# Patient Record
Sex: FEMALE | Race: WHITE | URBAN - NONMETROPOLITAN AREA
[De-identification: names, ages, dates, MRNs, and addresses within clinical notes are randomized per-mention and may not be internally consistent; named-entity substitution may affect disease eponyms.]

---

## 2017-04-25 ENCOUNTER — TELEPHONE (OUTPATIENT)
Dept: FAMILY MEDICINE | Facility: OTHER | Age: 42
End: 2017-04-25

## 2017-04-25 NOTE — TELEPHONE ENCOUNTER
Panel Management Review      Patient has the following on her problem list:       Composite cancer screening  Chart review shows that this patient is due/due soon for the following   Summary:    Patient is due/failing the following:   ACT, PAP and PHQ9    Action needed:   Patient needs office visit for physical.    Type of outreach:    Sent letter.  Phone number is incorrect.    Questions for provider review:    None                                                                                                                                    Ximena EID LPN       Chart routed to Ximena EID LPN   .

## 2017-04-25 NOTE — LETTER
[facility]  [address] [place] [address]  [phone]        April 25, 2017    Anais Miranda  [address]  [place] [address]          Dear Anais,    In coordination of your health care, we see you are overdue for asthma questions, depression questions and pap smear. Please schedule an appointment with your primary provider.        If you have any questions or problems, please give us a call at the clinic.       Sincerely,        Spencer Canchola PA-C/michaela,TOMEKAN

## 2017-07-11 ENCOUNTER — TELEPHONE (OUTPATIENT)
Dept: FAMILY MEDICINE | Facility: OTHER | Age: 42
End: 2017-07-11

## 2017-07-11 NOTE — TELEPHONE ENCOUNTER
Pt is past due for f/u pap smear.  Reminder letter was sent 12/23/16 and 04/25/17.  LMTC and schedule at [facility].  Left this writer's number in case of questions ([phone]).  If no reply and/or appt within 2 weeks (07/25/17) pt will be considered lost to pap tracking f/u.  Clarisa Patel,    Pap Tracking

## 2017-08-26 ENCOUNTER — HEALTH MAINTENANCE LETTER (OUTPATIENT)
Age: 42
End: 2017-08-26
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
## 2017-11-13 ENCOUNTER — OFFICE VISIT (OUTPATIENT)
Dept: FAMILY MEDICINE | Facility: OTHER | Age: 42
End: 2017-11-13
Payer: MEDICAID

## 2017-11-13 ENCOUNTER — RESULT FOLLOW UP (OUTPATIENT)
Dept: FAMILY MEDICINE | Facility: OTHER | Age: 42
End: 2017-11-13

## 2017-11-13 VITALS
DIASTOLIC BLOOD PRESSURE: 78 MMHG | TEMPERATURE: 98.4 F | BODY MASS INDEX: 22.36 KG/M2 | RESPIRATION RATE: 16 BRPM | SYSTOLIC BLOOD PRESSURE: 112 MMHG | WEIGHT: 131 LBS | HEIGHT: 64 IN | HEART RATE: 104 BPM

## 2017-11-13 DIAGNOSIS — F41.9 ANXIETY: ICD-10-CM

## 2017-11-13 DIAGNOSIS — F40.10 SOCIAL PHOBIA: ICD-10-CM

## 2017-11-13 DIAGNOSIS — Z72.0 TOBACCO ABUSE: ICD-10-CM

## 2017-11-13 DIAGNOSIS — F33.0 MAJOR DEPRESSIVE DISORDER, RECURRENT EPISODE, MILD (H): ICD-10-CM

## 2017-11-13 DIAGNOSIS — D06.9 CARCINOMA IN SITU OF CERVIX, UNSPECIFIED LOCATION: ICD-10-CM

## 2017-11-13 DIAGNOSIS — F33.1 MAJOR DEPRESSIVE DISORDER, RECURRENT EPISODE, MODERATE (H): ICD-10-CM

## 2017-11-13 DIAGNOSIS — F10.10 ETOH ABUSE: ICD-10-CM

## 2017-11-13 DIAGNOSIS — F43.10 PTSD (POST-TRAUMATIC STRESS DISORDER): ICD-10-CM

## 2017-11-13 DIAGNOSIS — Z72.0 TOBACCO ABUSE: Primary | ICD-10-CM

## 2017-11-13 DIAGNOSIS — N76.0 BACTERIAL VAGINAL INFECTION: ICD-10-CM

## 2017-11-13 DIAGNOSIS — D06.7 CARCINOMA IN SITU OF OTHER PART OF CERVIX: ICD-10-CM

## 2017-11-13 DIAGNOSIS — F31.10 BIPOLAR DISORDER, MOST RECENT EPISODE MANIC (H): ICD-10-CM

## 2017-11-13 DIAGNOSIS — B96.89 BACTERIAL VAGINAL INFECTION: ICD-10-CM

## 2017-11-13 DIAGNOSIS — F31.73 BIPOLAR DISORDER, IN PARTIAL REMISSION, MOST RECENT EPISODE MANIC (H): ICD-10-CM

## 2017-11-13 DIAGNOSIS — J45.30 MILD PERSISTENT ASTHMA WITHOUT COMPLICATION: ICD-10-CM

## 2017-11-13 DIAGNOSIS — Z90.710 S/P HYSTERECTOMY: ICD-10-CM

## 2017-11-13 DIAGNOSIS — Z72.51 HIGH RISK SEXUAL BEHAVIOR: ICD-10-CM

## 2017-11-13 DIAGNOSIS — F41.0 PANIC ATTACK: ICD-10-CM

## 2017-11-13 LAB
SPECIMEN SOURCE: ABNORMAL
WET PREP SPEC: ABNORMAL

## 2017-11-13 PROCEDURE — 88142 CYTOPATH C/V THIN LAYER: CPT | Performed by: PHYSICIAN ASSISTANT

## 2017-11-13 PROCEDURE — 87624 HPV HI-RISK TYP POOLED RSLT: CPT | Performed by: PHYSICIAN ASSISTANT

## 2017-11-13 PROCEDURE — 99214 OFFICE O/P EST MOD 30 MIN: CPT | Performed by: PHYSICIAN ASSISTANT

## 2017-11-13 PROCEDURE — 87210 SMEAR WET MOUNT SALINE/INK: CPT | Performed by: PHYSICIAN ASSISTANT

## 2017-11-13 RX ORDER — QUETIAPINE FUMARATE 100 MG/1
200 TABLET, FILM COATED ORAL 2 TIMES DAILY
Qty: 100 TABLET | Refills: 1 | Status: SHIPPED | OUTPATIENT
Start: 2017-11-13 | End: 2018-03-06

## 2017-11-13 RX ORDER — QUETIAPINE FUMARATE 50 MG/1
50 TABLET, FILM COATED ORAL 2 TIMES DAILY
Qty: 60 TABLET | Refills: 1 | Status: SHIPPED | OUTPATIENT
Start: 2017-11-13 | End: 2018-03-06

## 2017-11-13 RX ORDER — METRONIDAZOLE 500 MG/1
500 TABLET ORAL 2 TIMES DAILY
Qty: 14 TABLET | Refills: 0 | Status: SHIPPED | OUTPATIENT
Start: 2017-11-13 | End: 2018-03-06

## 2017-11-13 ASSESSMENT — ANXIETY QUESTIONNAIRES
1. FEELING NERVOUS, ANXIOUS, OR ON EDGE: MORE THAN HALF THE DAYS
GAD7 TOTAL SCORE: 11
6. BECOMING EASILY ANNOYED OR IRRITABLE: SEVERAL DAYS
5. BEING SO RESTLESS THAT IT IS HARD TO SIT STILL: MORE THAN HALF THE DAYS
GAD7 TOTAL SCORE: 11
7. FEELING AFRAID AS IF SOMETHING AWFUL MIGHT HAPPEN: SEVERAL DAYS
GAD7 TOTAL SCORE: 11
2. NOT BEING ABLE TO STOP OR CONTROL WORRYING: MORE THAN HALF THE DAYS
7. FEELING AFRAID AS IF SOMETHING AWFUL MIGHT HAPPEN: SEVERAL DAYS
4. TROUBLE RELAXING: SEVERAL DAYS
3. WORRYING TOO MUCH ABOUT DIFFERENT THINGS: MORE THAN HALF THE DAYS

## 2017-11-13 ASSESSMENT — PAIN SCALES - GENERAL: PAINLEVEL: NO PAIN (0)

## 2017-11-13 ASSESSMENT — PATIENT HEALTH QUESTIONNAIRE - PHQ9
10. IF YOU CHECKED OFF ANY PROBLEMS, HOW DIFFICULT HAVE THESE PROBLEMS MADE IT FOR YOU TO DO YOUR WORK, TAKE CARE OF THINGS AT HOME, OR GET ALONG WITH OTHER PEOPLE: VERY DIFFICULT
SUM OF ALL RESPONSES TO PHQ QUESTIONS 1-9: 11
SUM OF ALL RESPONSES TO PHQ QUESTIONS 1-9: 11

## 2017-11-13 NOTE — MR AVS SNAPSHOT
After Visit Summary   11/13/2017    Anais Miranda    MRN: [id number]           Patient Information     Date Of Birth          1975        Visit Information        Provider Department      11/13/2017 11:00 AM Spencer Peter PA-C [facility]        Today's Diagnoses     Tobacco abuse    -  1    Social phobia        PTSD (post-traumatic stress disorder)        Major depressive disorder, recurrent episode, moderate (H)        High risk sexual behavior        Bipolar disorder, most recent episode manic (H)        ETOH abuse        Panic attack        Bipolar disorder, in partial remission, most recent episode manic (H)        Anxiety        Major depressive disorder, recurrent episode, mild (H)        PTSD (post-traumatic stress disorder)        Major depressive disorder, recurrent episode, moderate        Mild persistent asthma without complication        S/P hysterectomy        Carcinoma in situ of other part of cervix           Follow-ups after your visit        Additional Services     ASTHMA EDUCATION REFERRAL         Please be aware that coverage of these services is subject to the terms and limitations of your health insurance plan.  Call member services at your health plan with any benefit or coverage questions.      Please bring the following to your appointment:     >>   List of current medications  >>   This referral request   >>   Any documents/labs given to you for this referral  Your spacer device and/or peak meter if you have one                  Future tests that were ordered for you today     Open Future Orders        Priority Expected Expires Ordered    ASTHMA EDUCATION REFERRAL Routine  5/13/2018 11/13/2017            Who to contact     If you have questions or need follow up information about today's clinic visit or your schedule please contact [facility] directly at [phone].  Normal or non-critical lab and imaging results will be communicated  "to you by Turbinehart, letter or phone within 4 business days after the clinic has received the results. If you do not hear from us within 7 days, please contact the clinic through [employer] or phone. If you have a critical or abnormal lab result, we will notify you by phone as soon as possible.  Submit refill requests through [employer] or call your pharmacy and they will forward the refill request to us. Please allow 3 business days for your refill to be completed.          Additional Information About Your Visit        [employer] Information     [employer]One lets you send messages to your doctor, view your test results, renew your prescriptions, schedule appointments and more. To sign up, go to [email or web address] . Click on \"Log in\" on the left side of the screen, which will take you to the Welcome page. Then click on \"Sign up Now\" on the right side of the page.     You will be asked to enter the access code listed below, as well as some personal information. Please follow the directions to create your username and password.     Your access code is: [email or web address]  Expires: 2018 11:27 AM     Your access code will  in 90 days. If you need help or a new code, please call your [facility] clinic or [phone].        Care EveryWhere ID     This is your Care EveryWhere ID. This could be used by other organizations to access your [facility] medical records  [id number]        Your Vitals Were     Pulse Temperature Respirations Height Last Period BMI (Body Mass Index)    104 98.4  F (36.9  C) (Temporal) 16 5' 3.89\" (1.623 m) 2008 22.56 kg/m2       Blood Pressure from Last 3 Encounters:   17 112/78   11/10/16 114/60   16 100/62    Weight from Last 3 Encounters:   17 131 lb (59.4 kg)   11/10/16 140 lb 1.6 oz (63.5 kg)   16 127 lb 9.6 oz (57.9 kg)              We Performed the Following     Pap imaged thin layer screen with HPV - recommended age 30 - 65 years (select HPV order below)     Wet prep  "         Where to get your medicines      Some of these will need a paper prescription and others can be bought over the counter.  Ask your nurse if you have questions.     Bring a paper prescription for each of these medications     QUEtiapine 100 MG tablet    QUEtiapine 50 MG tablet          Primary Care Provider Office Phone # Fax #    Spencer Peter PA-C [phone]       [address] [facility] [address]        Equal Access to Services     DREW MARTIN : Hadii aad ku hadasho Soomaali, waaxda luqadaha, qaybta kaalmada adeegyada, waxay idiin hayaan adeeg ashleystevenken alexandre . So [facility] [phone].    ATENCIÓN: Si habla espirina, tiene a swan disposición servicios gratuitos de asistencia lingüística. Llame al [phone].    We comply with applicable federal civil rights laws and [place] laws. We do not discriminate on the basis of race, color, national origin, age, disability, sex, sexual orientation, or gender identity.            Thank you!     Thank you for choosing [facility]  for your care. Our goal is always to provide you with excellent care. Hearing back from our patients is one way we can continue to improve our services. Please take a few minutes to complete the written survey that you may receive in the mail after your visit with us. Thank you!             Your Updated Medication List - Protect others around you: Learn how to safely use, store and throw away your medicines at www.disposemymeds.org.          This list is accurate as of: 11/13/17 11:27 AM.  Always use your most recent med list.                   Brand Name Dispense Instructions for use Diagnosis    albuterol 108 (90 BASE) MCG/ACT Inhaler    PROAIR HFA/PROVENTIL HFA/VENTOLIN HFA    3 Inhaler    Inhale 2 puffs into the lungs every 6 hours as needed for shortness of breath / dyspnea    Mild persistent asthma without complication, Bipolar affective disorder, remission status unspecified (H), Social phobia, Panic attack,  PTSD (post-traumatic stress disorder)       LORazepam 1 MG tablet    ATIVAN    20 tablet    Take 1 tablet (1 mg) by mouth every 8 hours as needed for anxiety    Social phobia, Panic attack, Major depressive disorder, recurrent episode, mild (H), PTSD (post-traumatic stress disorder), Bipolar disorder, in partial remission, most recent episode manic (H), Major depressive disorder, recurrent episode, moderate (H), Tobacco abuse, ETOH abuse, Anxiety       * QUEtiapine 50 MG tablet    SEROQUEL    60 tablet    Take 1 tablet (50 mg) by mouth 2 times daily    Major depressive disorder, recurrent episode, moderate (H), Social phobia, Panic attack, PTSD (post-traumatic stress disorder), Bipolar disorder, in partial remission, most recent episode manic (H), Anxiety, Major depressive disorder, recurrent episode, mild (H), Tobacco abuse, ETOH abuse       * QUEtiapine 100 MG tablet    SEROquel    100 tablet    Take 2 tablets (200 mg) by mouth 2 times daily For one week then 150 PO BID.    Social phobia, Panic attack, Major depressive disorder, recurrent episode, mild (H), Bipolar disorder, in partial remission, most recent episode manic (H), Major depressive disorder, recurrent episode, moderate (H), Tobacco abuse, ETOH abuse, Anxiety, PTSD (post-traumatic stress disorder)       * Notice:  This list has 2 medication(s) that are the same as other medications prescribed for you. Read the directions carefully, and ask your doctor or other care provider to review them with you.

## 2017-11-13 NOTE — LETTER
January 23, 2018      Anais Miranda  [address]   [facility] [address]    Dear ,      At [facility], your health and wellness is our primary concern. That is why we are following up on a positive high risk HPV test from 11/13/17, which was reported as HPV 16. Your provider had recommended that you have a Colposcopy completed by 2/13/18. Our records do not show that this has been scheduled.    It is important to complete the follow up that your provider has suggested for you to ensure that there are no worsening changes which may, over time, develop into cancer.      Please contact our office at  [phone] to schedule an appointment for a Colposcopy at your earliest convenience. If you have questions or concerns, please call the clinic and we will be happy to assist you.    If you have completed the tests outside of [facility], please have the results forwarded to our office. We will update the chart for your primary Physician to review before your next annual physical.     Thank you for choosing [facility]!    Sincerely,      Spencer Longo PA-C/odin

## 2017-11-13 NOTE — NURSING NOTE
"Chief Complaint   Patient presents with     Physical     Depression     Panel Management     mychart, flu, tdap, pap, phq9, AAP, ACT       Initial /78 (Cuff Size: Adult Regular)  Pulse 104  Temp 98.4  F (36.9  C) (Temporal)  Resp 16  Ht 5' 3.89\" (1.623 m)  Wt 131 lb (59.4 kg)  LMP 01/04/2008  BMI 22.56 kg/m2 Estimated body mass index is 22.56 kg/(m^2) as calculated from the following:    Height as of this encounter: 5' 3.89\" (1.623 m).    Weight as of this encounter: 131 lb (59.4 kg).  Medication Reconciliation: complete  "

## 2017-11-13 NOTE — LETTER
January 15, 2018      Anais Miranda  [address]   [facility] [address]    Dear ,      We are contacting you in writing because we have been unable to reach you by phone.    This letter is in regards to the pap smear and HPV (Human Papillomavirus) test you had done recently. Your PAP test result is normal, but your HPV (Human Papillomavirus) test was positive for a high risk type of the HPV. HPV is a common virus found in sexually active men and women. Some high risk strains of the HPV virus can be risk factors for later development of cancer.    About 80 percent of women have been exposed to HPV virus throughout their lifetime. There is no medication for the treatment of HPV. Typically your own immune system gets rid of the virus before it does harm. HPV is spread by direct skin-to-skin contact, including sexual intercourse, oral sex, anal sex, or any other contact involving the genital area (example: hand to genital contact). It is not possible to become infected with HPV by touching an object, such as a toilet seat. Most people who are infected with HPV have no signs or symptoms.    Your doctor has recommended that you have specific test called colposcopy. This test allows the provider to closely examine your cervix. If biopsies are taken, the results will be complete within two weeks and will be used to determine the plan for future follow-up.      Please call the [facility] at [phone] to schedule this procedure. Schedule this for a time when you are not due to have a period (if having regular menstrual bleeding). You can take an over the counter pain reliever 1 hour before your colposcopy. Nothing in the vagina for 24 hours before your colposcopy (no sex, douches, vaginal medications or lubricants).     If you have questions regarding this result please contact [phone].    Sincerely,      Spencer Longo PA-C/daxa

## 2017-11-13 NOTE — LETTER
My Asthma Action Plan  Name: Anais Miranda   YOB: 1975  Date: 11/20/2017   My doctor: Spencer Longo PA-C   My clinic: [facility]        My Control Medicine:   My Rescue Medicine: Albuterol (Proair/Ventolin/Proventil) inhaler as directed   My Asthma Severity: intermittent  Avoid your asthma triggers: upper respiratory infections, mold and cold air               GREEN ZONE   Good Control    I feel good    No cough or wheeze    Can work, sleep and play without asthma symptoms       Take your asthma control medicine every day.     1. If exercise triggers your asthma, take your rescue medication    15 minutes before exercise or sports, and    During exercise if you have asthma symptoms  2. Spacer to use with inhaler: If you have a spacer, make sure to use it with your inhaler             YELLOW ZONE Getting Worse  I have ANY of these:    I do not feel good    Cough or wheeze    Chest feels tight    Wake up at night   1. Keep taking your Green Zone medications  2. Start taking your rescue medicine:    every 20 minutes for up to 1 hour. Then every 4 hours for 24-48 hours.  3. If you stay in the Yellow Zone for more than 12-24 hours, contact your doctor.  4. If you do not return to the Green Zone in 12-24 hours or you get worse, start taking your oral steroid medicine if prescribed by your provider.           RED ZONE Medical Alert - Get Help  I have ANY of these:    I feel awful    Medicine is not helping    Breathing getting harder    Trouble walking or talking    Nose opens wide to breathe       1. Take your rescue medicine NOW  2. If your provider has prescribed an oral steroid medicine, start taking it NOW  3. Call your doctor NOW  4. If you are still in the Red Zone after 20 minutes and you have not reached your doctor:    Take your rescue medicine again and    Call 911 or go to the emergency room right away    See your regular doctor within 2 weeks of an Emergency Room or Urgent  Care visit for follow-up treatment.        Electronically signed by: Spencer Longo, November 20, 2017    Annual Reminders:  Meet with Asthma Educator,  Flu Shot in the Fall, consider Pneumonia Vaccination for patients with asthma (aged 19 and older).    Pharmacy:    [facility], [place] - [address] [id number] - [place], [place] - [address] [facility] AT [address] [place] [address] & [facility] - [place], [place] - [address] [facility] [address] - [place], [place] - [address] AT [place] [address] [address]                    Asthma Triggers  How To Control Things That Make Your Asthma Worse    Triggers are things that make your asthma worse.  Look at the list below to help you find your triggers and what you can do about them.  You can help prevent asthma flare-ups by staying away from your triggers.      Trigger                                                          What you can do   Cigarette Smoke  Tobacco smoke can make asthma worse. Do not allow smoking in your home, car or around you.  Be sure no one smokes at a child s day care or school.  If you smoke, ask your health care provider for ways to help you quit.  Ask family members to quit too.  Ask your health care provider for a referral to Quit Plan to help you quit smoking, or call 8-450-498-PLAN.     Colds, Flu, Bronchitis  These are common triggers of asthma. Wash your hands often.  Don t touch your eyes, nose or mouth.  Get a flu shot every year.     Dust Mites  These are tiny bugs that live in cloth or carpet. They are too small to see. Wash sheets and blankets in hot water every week.   Encase pillows and mattress in dust mite proof covers.  Avoid having carpet if you can. If you have carpet, vacuum weekly.   Use a dust mask and HEPA vacuum.   Pollen and Outdoor Mold  Some people are allergic to trees, grass, or weed pollen, or molds. Try to keep your windows  closed.  Limit time out doors when pollen count is high.   Ask you health care provider about taking medicine during allergy season.     Animal Dander  Some people are allergic to skin flakes, urine or saliva from pets with fur or feathers. Keep pets with fur or feathers out of your home.    If you can t keep the pet outdoors, then keep the pet out of your bedroom.  Keep the bedroom door closed.  Keep pets off cloth furniture and away from stuffed toys.     Mice, Rats, and Cockroaches  Some people are allergic to the waste from these pests.   Cover food and garbage.  Clean up spills and food crumbs.  Store grease in the refrigerator.   Keep food out of the bedroom.   Indoor Mold  This can be a trigger if your home has high moisture. Fix leaking faucets, pipes, or other sources of water.   Clean moldy surfaces.  Dehumidify basement if it is damp and smelly.   Smoke, Strong Odors, and Sprays  These can reduce air quality. Stay away from strong odors and sprays, such as perfume, powder, hair spray, paints, smoke incense, paint, cleaning products, candles and new carpet.   Exercise or Sports  Some people with asthma have this trigger. Be active!  Ask your doctor about taking medicine before sports or exercise to prevent symptoms.    Warm up for 5-10 minutes before and after sports or exercise.     Other Triggers of Asthma  Cold air:  Cover your nose and mouth with a scarf.  Sometimes laughing or crying can be a trigger.  Some medicines and food can trigger asthma.

## 2017-11-13 NOTE — LETTER
[facility]  [address] [facility] [address]  Phone: [phone]    November 13, 2017        Anais Miranda  [address]   [facility] [address]          To whom it may concern:    RE: Anais Miranda    Patient was seen and treated today at our clinic and missed work.  She is struggling with her PTSD and ETOH abuse again.  Please excuse her from work for 2 weeks and it is advised that she consider disability testing for mental health reasons.    Please contact me for questions or concerns.      Sincerely,        Spencer Longo PA-C

## 2017-11-13 NOTE — LETTER
October 29, 2018      Anais Miranda  [address]   [facility] [address]    Dear ,      At [facility], your health and wellness is our primary concern. That is why we are following up on a colposcopy from 4/12/18. Your provider had recommended that you have a Pap smear and HPV test completed by 11/13/18. Our records do not show that this has been scheduled.    It is important to complete the follow up that your provider has suggested for you to ensure that there are no worsening changes which may, over time, develop into cancer.      Please contact our office at  [phone] to schedule an appointment for a Pap smear and HPV test at your earliest convenience. If you have questions or concerns, please call the clinic and we will be happy to assist you.    If you have completed the tests outside of [facility], please have the results forwarded to our office. We will update the chart for your primary Physician to review before your next annual physical.     Thank you for choosing [facility]!    Sincerely,      Spencer Longo PA-C/odin

## 2017-11-14 RX ORDER — QUETIAPINE FUMARATE 50 MG/1
TABLET, FILM COATED ORAL
Qty: 60 TABLET | Refills: 0 | OUTPATIENT
Start: 2017-11-14

## 2017-11-14 ASSESSMENT — ANXIETY QUESTIONNAIRES: GAD7 TOTAL SCORE: 11

## 2017-11-14 ASSESSMENT — ASTHMA QUESTIONNAIRES: ACT_TOTALSCORE: 15

## 2017-11-15 ENCOUNTER — TELEPHONE (OUTPATIENT)
Dept: FAMILY MEDICINE | Facility: OTHER | Age: 42
End: 2017-11-15

## 2017-11-15 LAB
COPATH REPORT: NORMAL
PAP: NORMAL

## 2017-11-15 NOTE — TELEPHONE ENCOUNTER
Reason for Call:  Form, our goal is to have forms completed with 72 hours, however, some forms may require a visit or additional information.    Type of letter, form or note:  medical    Who is the form from?: [facility]  (if other please explain)    Where did the form come from: form was faxed in    What clinic location was the form placed at?: [facility] - [phone]    Where the form was placed: 's Box    What number is listed as a contact on the form?: [phone]       Additional comments: none     Call taken on 11/15/2017 at 2:28 PM by Denise Murguia

## 2017-11-16 NOTE — TELEPHONE ENCOUNTER
Left message with person who answered the phone and they will see Anais around noon and have her call us back then, please inform patient of message below, thanks  Melva Guerrero RT (R)

## 2017-11-16 NOTE — TELEPHONE ENCOUNTER
She is to follow up with me in 4 weeks to check on medication compliance and overall success or failure of treatment. Please advise her that rather fill this out in person. If she wishes to have this filled out expediently help her make an appointment so that we can do this in person rather than over the phone.  Electronically signed:    Spencer Longo PA-C

## 2017-11-16 NOTE — TELEPHONE ENCOUNTER
Spoke to patient informed of message below.. Patient states she has not worked since August 2016 and would just like Spencer to fill out the paper work patient does not want to come in again until the recommended date.    If provider has further questions patient can be reached at the number below.    Cell: [phone]  Lorena Queen CMA ([facility])

## 2017-11-16 NOTE — TELEPHONE ENCOUNTER
Form completed from old records and psychiatric med management would be wise as well as functional capacity exam is advised.  Electronically signed:    Spencer Longo PA-C

## 2017-11-17 LAB
FINAL DIAGNOSIS: ABNORMAL
HPV HR 12 DNA CVX QL NAA+PROBE: POSITIVE
HPV16 DNA SPEC QL NAA+PROBE: POSITIVE
HPV18 DNA SPEC QL NAA+PROBE: NEGATIVE
SPECIMEN DESCRIPTION: ABNORMAL

## 2017-11-19 NOTE — PROGRESS NOTES
12/3/07 pap ASCUS + HR HPV  1/9/08 colp- WNL  6/27/08 LSIL/+ HR HPV  11/24/08 colp- OLVIA 1 and OLIVA 3  4/29/09--Total vaginal hysterectomy. Uterus:Cervix with no diagnostic abnormalities.   12/1/10 pap NIL  1/13/15 vaginal pap NIL/+ HR HPV 16 and other HR HPV. Plan: cotest in 1 yr.  07/25/17 Would consider patient to be lost to follow-up.  11/13/17 Vaginal NIL pap, + HR HPV # 16 and other (no 18). Plan: colp   11/28/17 Left message with member of household for patient to return call (ajk)  01/02/18 LM for pt to call back ([facility])  1/15/18 Left msg. Patient is not reading My [employer]. Will send certified letter.   01/15/18 Result letter sent certified at request of RN:  [phone] ([facility])   1/23/18 [facility] reminder letter sent (rl)  02/12/18 Certified letter was returned as unclaimed. Sent to [facility] for scanning into pts chart. ([facility])  2/13/18 3 month [facility] not done, updated to 6mo [facility]/Pap due by 5/13/18 (rl)  2/28/18 FYI sent to provider.   4/12/18: colp without bx. Visually normal, no lesions. Plan: cotest due 11/13/18.    10/29/18 Cotest reminder letter sent ([facility])  11/21/18 Pap Follow up reminder call placed ([facility])  12/9/18 Patient is lost to follow-up. Routed to provider as FYI. ([facility])

## 2017-11-28 ENCOUNTER — TELEPHONE (OUTPATIENT)
Dept: FAMILY MEDICINE | Facility: OTHER | Age: 42
End: 2017-11-28

## 2017-11-28 NOTE — TELEPHONE ENCOUNTER
Spoke with patient informed her that Spencer didn't try calling her, no further questions  Closing encounter  Melva Guerrero RT (R)

## 2018-02-23 NOTE — PROGRESS NOTES
SUBJECTIVE:   Anais Miranda is a 42 year old female who presents to clinic today for the following health issues:    History of Present Illness     Asthma:     Cough::  YES    Wheezing::  YES    Dyspnea::  YES    Use of rescue inhaler::  At least daily    Taking Asthma medication as prescribed::  NO    Asthma triggers::  Animal dander, Cold air, Dust mites, Emotions, Exercise or sports, Humidity, Mold, Pollens, Strong odors and fumes and Upper respiratory infections    ER/UC Visits or Admissions::  None    Depression & Anxiety Follow-up:     Depression/Anxiety:  Depression & Anxiety    Status since last visit::  Stable    Other associated symptoms of depression and anxiety::  YES    Significant life event::  No    Current substance use::  None       Today's PHQ-9         PHQ-9 Total Score:     (P) 10   PHQ-9 Q9 Suicidal ideation:   (P) Not at all   Thoughts of suicide or self harm:      Self-harm Plan:        Self-harm Action:          Safety concerns for self or others:            Diet:  Regular (no restrictions)  Frequency of exercise:  2-3 days/week  Duration of exercise:  15-30 minutes  Taking medications regularly:  No  Barriers to taking medications:  Cost of medication  Medication side effects:  Not applicable  Additional concerns today:  No    Problem list and histories reviewed & adjusted, as indicated.  Additional history: as documented    Patient Active Problem List   Diagnosis     High risk sexual behavior     Thrombocytopenia, primary (H)     Tobacco abuse     CARDIOVASCULAR SCREENING; LDL GOAL LESS THAN 160     Seasonal allergic rhinitis     OLIVA 3 - cervical intraepithelial neoplasia grade 3     Mild persistent asthma without complication     Major depressive disorder, recurrent episode, moderate (H)     Social phobia     Panic attack     PTSD (post-traumatic stress disorder)     Bipolar disorder, most recent episode manic (H)     ETOH abuse     Anxiety     High grade squamous intraepithelial  lesion (HGSIL) on cytologic smear of cervix     Past Surgical History:   Procedure Laterality Date     HC REMOVAL OF OVARY/TUBE(S)  2001    Salpingo-Oophorectomy, Unilateral (R side) Cyst inside ovary     HYSTERECTOMY, VAGINAL  04/29/09    OLIVA 3        Social History   Substance Use Topics     Smoking status: Current Some Day Smoker     Packs/day: 0.50     Years: 10.00     Types: Cigarettes     Last attempt to quit: 1/9/2008     Smokeless tobacco: Never Used     Alcohol use No      Comment: occasionally     Family History   Problem Relation Age of Onset     DIABETES Father      CEREBROVASCULAR DISEASE Father      DIABETES Paternal Grandmother      CEREBROVASCULAR DISEASE Paternal Grandmother      DIABETES Paternal Grandfather      CANCER Paternal Grandfather      CEREBROVASCULAR DISEASE Maternal Grandmother      CANCER Maternal Grandfather      lung cancer     Depression Mother      Psychotic Disorder Brother      Bi-Polar     Neurologic Disorder Sister      Epilepsy     Genetic Disorder Daughter      Down Syndrome     Obesity Sister      Breast Cancer Paternal Aunt 45     bilat mast     Breast Cancer Paternal Aunt 44     singel side mast         Current Outpatient Prescriptions   Medication Sig Dispense Refill     methylPREDNISolone (MEDROL DOSEPAK) 4 MG tablet Follow package instructions 21 tablet 0     albuterol (PROAIR HFA/PROVENTIL HFA/VENTOLIN HFA) 108 (90 BASE) MCG/ACT Inhaler Inhale 2 puffs into the lungs every 6 hours as needed for shortness of breath / dyspnea 3 Inhaler 1     fluticasone furoate (ARNUITY ELLIPTA) 200 MCG/ACT inhalation powder Inhale 1 puff into the lungs daily 1 Inhaler 3     QUEtiapine (SEROQUEL) 50 MG tablet Take 1 tablet (50 mg) by mouth 2 times daily 60 tablet 1     QUEtiapine (SEROQUEL) 100 MG tablet Take 2 tablets (200 mg) by mouth 2 times daily For one week then 150 PO BID. 100 tablet 1     [DISCONTINUED] QUEtiapine (SEROQUEL) 50 MG tablet Take 1 tablet (50 mg) by mouth 2 times  daily 60 tablet 1     [DISCONTINUED] QUEtiapine (SEROQUEL) 100 MG tablet Take 2 tablets (200 mg) by mouth 2 times daily For one week then 150 PO BID. (Patient not taking: Reported on 3/6/2018) 100 tablet 1     [DISCONTINUED] albuterol (PROAIR HFA, PROVENTIL HFA, VENTOLIN HFA) 108 (90 BASE) MCG/ACT inhaler Inhale 2 puffs into the lungs every 6 hours as needed for shortness of breath / dyspnea 3 Inhaler 1     Allergies   Allergen Reactions     Blood-Group Specific Substance      pt has anti-Big E and anti-little C.  Orders for blood products may be delayed.  Draw one red and two purple top tubes for all Type and Screen/type and crossmatch orders     Coconut [Coconut Oil]      Throat closes, tongue swells     Darvocet [Acetaminophen]      Hives     Latex      pt reports getting itchy with prolonged latex     Remeron Soltab      Tongue swelling and throat swelling     Seasonal Allergies      Recent Labs   Lab Test  01/13/15   1041 03/14/14 05/18/11   0840   02/08/11   1135  12/01/10   0835   LDL  63   --    --    --    --   86   HDL  96   --    --    --    --   94   TRIG  73   --    --    --    --   100   ALT  18   --    --    --   23  20   CR  0.74  0.72  0.78   < >  0.73  0.75   GFRESTIMATED  88   --   85   < >  >90  90   GFRESTBLACK  >90   GFR Calc     --   >90   < >  >90  >90   POTASSIUM  4.0  4.3  4.6   < >  4.4  4.3   TSH  1.62   --    --    --    --   2.43    < > = values in this interval not displayed.      BP Readings from Last 3 Encounters:   03/06/18 120/80   11/13/17 112/78   11/10/16 114/60    Wt Readings from Last 3 Encounters:   03/06/18 128 lb 14.4 oz (58.5 kg)   11/13/17 131 lb (59.4 kg)   11/10/16 140 lb 1.6 oz (63.5 kg)                  Labs reviewed in EPIC    ROS:  Constitutional, HEENT, cardiovascular, pulmonary, GI, , musculoskeletal, neuro, skin, endocrine and psych systems are negative, except as otherwise noted.    OBJECTIVE:     /80 (Cuff Size: Adult Regular)  Pulse  "100  Temp 98.8  F (37.1  C) (Temporal)  Resp 18  Ht 5' 3.89\" (1.623 m)  Wt 128 lb 14.4 oz (58.5 kg)  LMP 01/04/2008  BMI 22.2 kg/m2  Body mass index is 22.2 kg/(m^2).  GENERAL: healthy, alert and no distress  NECK: no adenopathy, no asymmetry, masses, or scars and thyroid normal to palpation  RESP: lungs clear to auscultation - no rales, rhonchi or wheezes  CV: regular rate and rhythm, normal S1 S2, no S3 or S4, no murmur, click or rub, no peripheral edema and peripheral pulses strong  ABDOMEN: soft, nontender, no hepatosplenomegaly, no masses and bowel sounds normal  MS: no gross musculoskeletal defects noted, no edema  SKIN: no suspicious lesions or rashes though she does have scattered hives currently.  She also has scattered lipoma and lymphadenopathy cervically.  NEURO: Normal strength and tone, mentation intact and speech normal  PSYCH: anxious, fatigued, judgement and insight intact and appearance well groomed    Diagnostic Test Results:  No results found for this or any previous visit (from the past 24 hour(s)).    ASSESSMENT/PLAN:     Tobacco Cessation:   reports that she has been smoking Cigarettes.  She has a 5.00 pack-year smoking history. She has never used smokeless tobacco.  Tobacco Cessation Action Plan: Information offered: Patient not interested at this time      1. Need for prophylactic vaccination and inoculation against influenza  declines  2. Need for prophylactic vaccination with tetanus-diphtheria (TD)  accepts    3. Mild persistent asthma without complication  Refilled meds.  - methylPREDNISolone (MEDROL DOSEPAK) 4 MG tablet; Follow package instructions  Dispense: 21 tablet; Refill: 0  - albuterol (PROAIR HFA/PROVENTIL HFA/VENTOLIN HFA) 108 (90 BASE) MCG/ACT Inhaler; Inhale 2 puffs into the lungs every 6 hours as needed for shortness of breath / dyspnea  Dispense: 3 Inhaler; Refill: 1  - fluticasone furoate (ARNUITY ELLIPTA) 200 MCG/ACT inhalation powder; Inhale 1 puff into the lungs " daily  Dispense: 1 Inhaler; Refill: 3    4. Major depressive disorder, recurrent episode, moderate (H)  Restart meds and observe.  Would avoid benzodiazepines.  - QUEtiapine (SEROQUEL) 50 MG tablet; Take 1 tablet (50 mg) by mouth 2 times daily  Dispense: 60 tablet; Refill: 1    5. Hives  As directed  - methylPREDNISolone (MEDROL DOSEPAK) 4 MG tablet; Follow package instructions  Dispense: 21 tablet; Refill: 0    6. Bipolar affective disorder, remission status unspecified (H)  7. Social phobia  8. Panic attack  9. PTSD (post-traumatic stress disorder)  10. Bipolar disorder, in partial remission, most recent episode manic (H)  11. Anxiety  12. Tobacco abuse  13. ETOH abuse - history of  Refilled and advised psychiatry as well as SS disability assessment  - QUEtiapine (SEROQUEL) 50 MG tablet; Take 1 tablet (50 mg) by mouth 2 times daily  Dispense: 60 tablet; Refill: 1  - QUEtiapine (SEROQUEL) 100 MG tablet; Take 2 tablets (200 mg) by mouth 2 times daily For one week then 150 PO BID.  Dispense: 100 tablet; Refill: 1    14. High grade squamous intraepithelial lesion (HGSIL) on cytologic smear of cervix  Needs additional assessment.  - OB/GYN REFERRAL    15. Thrombocytopenia, primary (H)  History and in need of follow-up.  - CBC with platelets  - Comprehensive metabolic panel  - Lipid panel reflex to direct LDL Fasting    ROV 6 months.    Spencer Longo PA-C  [facility]  Answers for HPI/ROS submitted by the patient on 3/6/2018   PHQ-2 Score: 2  If you checked off any problems, how difficult have these problems made it for you to do your work, take care of things at home, or get along with other people?: Extremely difficult  PHQ9 TOTAL SCORE: 10

## 2018-03-04 ENCOUNTER — HEALTH MAINTENANCE LETTER (OUTPATIENT)
Age: 43
End: 2018-03-04

## 2018-03-06 ENCOUNTER — OFFICE VISIT (OUTPATIENT)
Dept: FAMILY MEDICINE | Facility: OTHER | Age: 43
End: 2018-03-06
Payer: MEDICAID

## 2018-03-06 ENCOUNTER — TELEPHONE (OUTPATIENT)
Dept: FAMILY MEDICINE | Facility: OTHER | Age: 43
End: 2018-03-06

## 2018-03-06 VITALS
HEART RATE: 100 BPM | TEMPERATURE: 98.8 F | RESPIRATION RATE: 18 BRPM | SYSTOLIC BLOOD PRESSURE: 120 MMHG | HEIGHT: 64 IN | BODY MASS INDEX: 22.01 KG/M2 | DIASTOLIC BLOOD PRESSURE: 80 MMHG | WEIGHT: 128.9 LBS

## 2018-03-06 DIAGNOSIS — Z23 NEED FOR PROPHYLACTIC VACCINATION AND INOCULATION AGAINST INFLUENZA: ICD-10-CM

## 2018-03-06 DIAGNOSIS — F10.10 ETOH ABUSE: ICD-10-CM

## 2018-03-06 DIAGNOSIS — L50.9 HIVES: ICD-10-CM

## 2018-03-06 DIAGNOSIS — F41.0 PANIC ATTACK: ICD-10-CM

## 2018-03-06 DIAGNOSIS — F43.10 PTSD (POST-TRAUMATIC STRESS DISORDER): ICD-10-CM

## 2018-03-06 DIAGNOSIS — F31.73 BIPOLAR DISORDER, IN PARTIAL REMISSION, MOST RECENT EPISODE MANIC (H): ICD-10-CM

## 2018-03-06 DIAGNOSIS — F40.10 SOCIAL PHOBIA: ICD-10-CM

## 2018-03-06 DIAGNOSIS — J45.30 MILD PERSISTENT ASTHMA WITHOUT COMPLICATION: Primary | ICD-10-CM

## 2018-03-06 DIAGNOSIS — Z23 NEED FOR VACCINATION: ICD-10-CM

## 2018-03-06 DIAGNOSIS — F31.9 BIPOLAR AFFECTIVE DISORDER, REMISSION STATUS UNSPECIFIED (H): ICD-10-CM

## 2018-03-06 DIAGNOSIS — F41.9 ANXIETY: ICD-10-CM

## 2018-03-06 DIAGNOSIS — Z72.0 TOBACCO ABUSE: ICD-10-CM

## 2018-03-06 DIAGNOSIS — D69.49: ICD-10-CM

## 2018-03-06 DIAGNOSIS — R87.613 HIGH GRADE SQUAMOUS INTRAEPITHELIAL LESION (HGSIL) ON CYTOLOGIC SMEAR OF CERVIX: ICD-10-CM

## 2018-03-06 DIAGNOSIS — F33.1 MAJOR DEPRESSIVE DISORDER, RECURRENT EPISODE, MODERATE (H): ICD-10-CM

## 2018-03-06 DIAGNOSIS — Z23 NEED FOR PROPHYLACTIC VACCINATION WITH TETANUS-DIPHTHERIA (TD): ICD-10-CM

## 2018-03-06 LAB
ALBUMIN SERPL-MCNC: 4.1 G/DL (ref 3.4–5)
ALP SERPL-CCNC: 55 U/L (ref 40–150)
ALT SERPL W P-5'-P-CCNC: 90 U/L (ref 0–50)
ANION GAP SERPL CALCULATED.3IONS-SCNC: 8 MMOL/L (ref 3–14)
AST SERPL W P-5'-P-CCNC: 120 U/L (ref 0–45)
BILIRUB SERPL-MCNC: 0.4 MG/DL (ref 0.2–1.3)
BUN SERPL-MCNC: 4 MG/DL (ref 7–30)
CALCIUM SERPL-MCNC: 8.8 MG/DL (ref 8.5–10.1)
CHLORIDE SERPL-SCNC: 107 MMOL/L (ref 94–109)
CHOLEST SERPL-MCNC: 206 MG/DL
CO2 SERPL-SCNC: 24 MMOL/L (ref 20–32)
CREAT SERPL-MCNC: 0.5 MG/DL (ref 0.52–1.04)
ERYTHROCYTE [DISTWIDTH] IN BLOOD BY AUTOMATED COUNT: 14.3 % (ref 10–15)
GFR SERPL CREATININE-BSD FRML MDRD: >90 ML/MIN/1.7M2
GLUCOSE SERPL-MCNC: 109 MG/DL (ref 70–99)
HCT VFR BLD AUTO: 36.6 % (ref 35–47)
HDLC SERPL-MCNC: 113 MG/DL
HGB BLD-MCNC: 13.2 G/DL (ref 11.7–15.7)
LDLC SERPL CALC-MCNC: 78 MG/DL
MCH RBC QN AUTO: 37.7 PG (ref 26.5–33)
MCHC RBC AUTO-ENTMCNC: 36.1 G/DL (ref 31.5–36.5)
MCV RBC AUTO: 105 FL (ref 78–100)
NONHDLC SERPL-MCNC: 93 MG/DL
PLATELET # BLD AUTO: 105 10E9/L (ref 150–450)
POTASSIUM SERPL-SCNC: 4.1 MMOL/L (ref 3.4–5.3)
PROT SERPL-MCNC: 7.8 G/DL (ref 6.8–8.8)
RBC # BLD AUTO: 3.5 10E12/L (ref 3.8–5.2)
SODIUM SERPL-SCNC: 139 MMOL/L (ref 133–144)
TRIGL SERPL-MCNC: 75 MG/DL
WBC # BLD AUTO: 5.5 10E9/L (ref 4–11)

## 2018-03-06 PROCEDURE — 85027 COMPLETE CBC AUTOMATED: CPT | Performed by: PHYSICIAN ASSISTANT

## 2018-03-06 PROCEDURE — 90471 IMMUNIZATION ADMIN: CPT | Performed by: PHYSICIAN ASSISTANT

## 2018-03-06 PROCEDURE — 90715 TDAP VACCINE 7 YRS/> IM: CPT | Performed by: PHYSICIAN ASSISTANT

## 2018-03-06 PROCEDURE — 99214 OFFICE O/P EST MOD 30 MIN: CPT | Mod: 25 | Performed by: PHYSICIAN ASSISTANT

## 2018-03-06 PROCEDURE — 80061 LIPID PANEL: CPT | Performed by: PHYSICIAN ASSISTANT

## 2018-03-06 PROCEDURE — 80053 COMPREHEN METABOLIC PANEL: CPT | Performed by: PHYSICIAN ASSISTANT

## 2018-03-06 PROCEDURE — 36415 COLL VENOUS BLD VENIPUNCTURE: CPT | Performed by: PHYSICIAN ASSISTANT

## 2018-03-06 RX ORDER — QUETIAPINE FUMARATE 50 MG/1
50 TABLET, FILM COATED ORAL 2 TIMES DAILY
Qty: 60 TABLET | Refills: 1 | Status: SHIPPED | OUTPATIENT
Start: 2018-03-06

## 2018-03-06 RX ORDER — QUETIAPINE FUMARATE 100 MG/1
200 TABLET, FILM COATED ORAL 2 TIMES DAILY
Qty: 100 TABLET | Refills: 1 | Status: SHIPPED | OUTPATIENT
Start: 2018-03-06

## 2018-03-06 RX ORDER — ALBUTEROL SULFATE 90 UG/1
2 AEROSOL, METERED RESPIRATORY (INHALATION) EVERY 6 HOURS PRN
Qty: 3 INHALER | Refills: 1 | Status: SHIPPED | OUTPATIENT
Start: 2018-03-06

## 2018-03-06 RX ORDER — METHYLPREDNISOLONE 4 MG
TABLET, DOSE PACK ORAL
Qty: 21 TABLET | Refills: 0 | Status: SHIPPED | OUTPATIENT
Start: 2018-03-06

## 2018-03-06 ASSESSMENT — PATIENT HEALTH QUESTIONNAIRE - PHQ9
SUM OF ALL RESPONSES TO PHQ QUESTIONS 1-9: 10
10. IF YOU CHECKED OFF ANY PROBLEMS, HOW DIFFICULT HAVE THESE PROBLEMS MADE IT FOR YOU TO DO YOUR WORK, TAKE CARE OF THINGS AT HOME, OR GET ALONG WITH OTHER PEOPLE: EXTREMELY DIFFICULT
SUM OF ALL RESPONSES TO PHQ QUESTIONS 1-9: 10

## 2018-03-06 ASSESSMENT — PAIN SCALES - GENERAL: PAINLEVEL: NO PAIN (0)

## 2018-03-06 NOTE — MR AVS SNAPSHOT
After Visit Summary   3/6/2018    Anais Miranda    MRN: [id number]           Patient Information     Date Of Birth          1975        Visit Information        Provider Department      3/6/2018 1:00 PM Spencer Peter PA-C [facility]        Today's Diagnoses     Mild persistent asthma without complication    -  1    Need for prophylactic vaccination and inoculation against influenza        Need for prophylactic vaccination with tetanus-diphtheria (TD)        Major depressive disorder, recurrent episode, moderate (H)        Hives        Bipolar affective disorder, remission status unspecified (H)        Social phobia        Panic attack        PTSD (post-traumatic stress disorder)        Bipolar disorder, in partial remission, most recent episode manic (H)        Anxiety        Tobacco abuse        ETOH abuse        High grade squamous intraepithelial lesion (HGSIL) on cytologic smear of cervix           Follow-ups after your visit        Additional Services     OB/GYN REFERRAL       Your provider has referred you to:  FMG: [facility] [phone]   [email or web address]/  FMG: [facility] [phone]   [email or web address]  GICH: [facility] [phone]   [email or web address]        Please be aware that coverage of these services is subject to the terms and limitations of your health insurance plan.  Call member services at your health plan with any benefit or coverage questions.      Please bring the following with you to your appointment:    (1) Any X-Rays, CTs or MRIs which have been performed.  Contact the facility where they were done to arrange for  prior to your scheduled appointment.   (2) List of current medications   (3) This referral request   (4) Any documents/labs given to you for this referral                 "  Who to contact     If you have questions or need follow up information about today's clinic visit or your schedule please contact [facility] directly at [phone].  Normal or non-critical lab and imaging results will be communicated to you by MyChart, letter or phone within 4 business days after the clinic has received the results. If you do not hear from us within 7 days, please contact the clinic through [employer] or phone. If you have a critical or abnormal lab result, we will notify you by phone as soon as possible.  Submit refill requests through [employer] or call your pharmacy and they will forward the refill request to us. Please allow 3 business days for your refill to be completed.          Additional Information About Your Visit        [employer] Information     [employer] gives you secure access to your electronic health record. If you see a primary care provider, you can also send messages to your care team and make appointments. If you have questions, please call your primary care clinic.  If you do not have a primary care provider, please call [phone] and they will assist you.        Care EveryWhere ID     This is your Care EveryWhere ID. This could be used by other organizations to access your [place] medical records  [id number]        Your Vitals Were     Pulse Temperature Respirations Height Last Period BMI (Body Mass Index)    100 98.8  F (37.1  C) (Temporal) 18 5' 3.89\" (1.623 m) 01/04/2008 22.2 kg/m2       Blood Pressure from Last 3 Encounters:   03/06/18 120/80   11/13/17 112/78   11/10/16 114/60    Weight from Last 3 Encounters:   03/06/18 128 lb 14.4 oz (58.5 kg)   11/13/17 131 lb (59.4 kg)   11/10/16 140 lb 1.6 oz (63.5 kg)              We Performed the Following     OB/GYN REFERRAL          Today's Medication Changes          These changes are accurate as of 3/6/18  1:20 PM.  If you have any questions, ask your nurse or doctor.               Start taking these medicines.  "       Dose/Directions    fluticasone furoate 200 MCG/ACT inhalation powder   Commonly known as:  ARNUITY ELLIPTA   Used for:  Mild persistent asthma without complication   Started by:  Spencer Peter PA-C        Dose:  1 puff   Inhale 1 puff into the lungs daily   Quantity:  1 Inhaler   Refills:  3       methylPREDNISolone 4 MG tablet   Commonly known as:  MEDROL DOSEPAK   Used for:  Mild persistent asthma without complication, Hives   Started by:  Spencer Peter PA-C        Follow package instructions   Quantity:  21 tablet   Refills:  0         Stop taking these medicines if you haven't already. Please contact your care team if you have questions.     LORazepam 1 MG tablet   Commonly known as:  ATIVAN   Stopped by:  Spencer Peter PA-C           metroNIDAZOLE 500 MG tablet   Commonly known as:  FLAGYL   Stopped by:  Spencer Peter PA-C                Where to get your medicines      These medications were sent to [id number] [address]  [address], [facility] [address]     Phone:  [phone]     albuterol 108 (90 BASE) MCG/ACT Inhaler    fluticasone furoate 200 MCG/ACT inhalation powder    methylPREDNISolone 4 MG tablet    QUEtiapine 100 MG tablet    QUEtiapine 50 MG tablet                Primary Care Provider Office Phone # Fax #    Spencer Peter PA-C [phone]       [address] [address]        Equal Access to Services     [facility] AH: Hadii aad ku hadasho Soomaali, waaxda luqadaha, qaybta kaalmada adeegyada, carlos idiin hayaan breann alexandre . So [facility] [phone].    ATENCIÓN: Si habla español, tiene a swan disposición servicios gratuitos de asistencia lingüística. Llame al [phone].    We comply with applicable federal civil rights laws and [place] laws. We do not discriminate on the basis of race, color, national origin, age, disability, sex, sexual orientation, or gender identity.            Thank you!      Thank you for choosing [facility]  for your care. Our goal is always to provide you with excellent care. Hearing back from our patients is one way we can continue to improve our services. Please take a few minutes to complete the written survey that you may receive in the mail after your visit with us. Thank you!             Your Updated Medication List - Protect others around you: Learn how to safely use, store and throw away your medicines at www.disposemymeds.org.          This list is accurate as of 3/6/18  1:20 PM.  Always use your most recent med list.                   Brand Name Dispense Instructions for use Diagnosis    albuterol 108 (90 BASE) MCG/ACT Inhaler    PROAIR HFA/PROVENTIL HFA/VENTOLIN HFA    3 Inhaler    Inhale 2 puffs into the lungs every 6 hours as needed for shortness of breath / dyspnea    Mild persistent asthma without complication, Bipolar affective disorder, remission status unspecified (H), Social phobia, Panic attack, PTSD (post-traumatic stress disorder)       fluticasone furoate 200 MCG/ACT inhalation powder    ARNUITY ELLIPTA    1 Inhaler    Inhale 1 puff into the lungs daily    Mild persistent asthma without complication       methylPREDNISolone 4 MG tablet    MEDROL DOSEPAK    21 tablet    Follow package instructions    Mild persistent asthma without complication, Hives       * QUEtiapine 50 MG tablet    SEROQUEL    60 tablet    Take 1 tablet (50 mg) by mouth 2 times daily    Major depressive disorder, recurrent episode, moderate (H), Social phobia, Bipolar disorder, in partial remission, most recent episode manic (H), Anxiety, Tobacco abuse, ETOH abuse       * QUEtiapine 100 MG tablet    SEROquel    100 tablet    Take 2 tablets (200 mg) by mouth 2 times daily For one week then 150 PO BID.    Social phobia, Bipolar disorder, in partial remission, most recent episode manic (H), Tobacco abuse, ETOH abuse, Anxiety       * Notice:  This list has 2 medication(s) that are  the same as other medications prescribed for you. Read the directions carefully, and ask your doctor or other care provider to review them with you.

## 2018-03-06 NOTE — LETTER
My Asthma Action Plan  Name: Anais Miranda   YOB: 1975  Date: March 6, 2018   My doctor: Spencer Longo PA-C   My clinic: [facility]        My Control Medicine:   My Rescue Medicine: Albuterol (Proair/Ventolin/Proventil) inhaler as directed   My Asthma Severity: intermittent  Avoid your asthma triggers: upper respiratory infections, mold and cold air               GREEN ZONE   Good Control    I feel good    No cough or wheeze    Can work, sleep and play without asthma symptoms       Take your asthma control medicine every day.     1. If exercise triggers your asthma, take your rescue medication    15 minutes before exercise or sports, and    During exercise if you have asthma symptoms  2. Spacer to use with inhaler: If you have a spacer, make sure to use it with your inhaler             YELLOW ZONE Getting Worse  I have ANY of these:    I do not feel good    Cough or wheeze    Chest feels tight    Wake up at night   1. Keep taking your Green Zone medications  2. Start taking your rescue medicine:    every 20 minutes for up to 1 hour. Then every 4 hours for 24-48 hours.  3. If you stay in the Yellow Zone for more than 12-24 hours, contact your doctor.  4. If you do not return to the Green Zone in 12-24 hours or you get worse, start taking your oral steroid medicine if prescribed by your provider.           RED ZONE Medical Alert - Get Help  I have ANY of these:    I feel awful    Medicine is not helping    Breathing getting harder    Trouble walking or talking    Nose opens wide to breathe       1. Take your rescue medicine NOW  2. If your provider has prescribed an oral steroid medicine, start taking it NOW  3. Call your doctor NOW  4. If you are still in the Red Zone after 20 minutes and you have not reached your doctor:    Take your rescue medicine again and    Call 911 or go to the emergency room right away    See your regular doctor within 2 weeks of an Emergency Room or Urgent  Care visit for follow-up treatment.        Electronically signed by: Spencer Longo, March 6, 2018     Annual Reminders:  Meet with Asthma Educator,  Flu Shot in the Fall, consider Pneumonia Vaccination for patients with asthma (aged 19 and older).    Pharmacy:    [facility], [place] - [address] [facility] [id number] - [place], [place] - [address] [facility] AT [address] [place] [address] & [facility] [place], [place] - [address] [facility] [address] - [place], [place] - [address] AT [place] [address] & [facility]                    Asthma Triggers  How To Control Things That Make Your Asthma Worse    Triggers are things that make your asthma worse.  Look at the list below to help you find your triggers and what you can do about them.  You can help prevent asthma flare-ups by staying away from your triggers.      Trigger                                                          What you can do   Cigarette Smoke  Tobacco smoke can make asthma worse. Do not allow smoking in your home, car or around you.  Be sure no one smokes at a child s day care or school.  If you smoke, ask your health care provider for ways to help you quit.  Ask family members to quit too.  Ask your health care provider for a referral to Quit Plan to help you quit smoking, or call 5-230-197-PLAN.     Colds, Flu, Bronchitis  These are common triggers of asthma. Wash your hands often.  Don t touch your eyes, nose or mouth.  Get a flu shot every year.     Dust Mites  These are tiny bugs that live in cloth or carpet. They are too small to see. Wash sheets and blankets in hot water every week.   Encase pillows and mattress in dust mite proof covers.  Avoid having carpet if you can. If you have carpet, vacuum weekly.   Use a dust mask and HEPA vacuum.   Pollen and Outdoor Mold  Some people are allergic to trees, grass, or weed pollen, or molds. Try to keep your windows closed.  Limit  time out doors when pollen count is high.   Ask you health care provider about taking medicine during allergy season.     Animal Dander  Some people are allergic to skin flakes, urine or saliva from pets with fur or feathers. Keep pets with fur or feathers out of your home.    If you can t keep the pet outdoors, then keep the pet out of your bedroom.  Keep the bedroom door closed.  Keep pets off cloth furniture and away from stuffed toys.     Mice, Rats, and Cockroaches  Some people are allergic to the waste from these pests.   Cover food and garbage.  Clean up spills and food crumbs.  Store grease in the refrigerator.   Keep food out of the bedroom.   Indoor Mold  This can be a trigger if your home has high moisture. Fix leaking faucets, pipes, or other sources of water.   Clean moldy surfaces.  Dehumidify basement if it is damp and smelly.   Smoke, Strong Odors, and Sprays  These can reduce air quality. Stay away from strong odors and sprays, such as perfume, powder, hair spray, paints, smoke incense, paint, cleaning products, candles and new carpet.   Exercise or Sports  Some people with asthma have this trigger. Be active!  Ask your doctor about taking medicine before sports or exercise to prevent symptoms.    Warm up for 5-10 minutes before and after sports or exercise.     Other Triggers of Asthma  Cold air:  Cover your nose and mouth with a scarf.  Sometimes laughing or crying can be a trigger.  Some medicines and food can trigger asthma.

## 2018-03-06 NOTE — TELEPHONE ENCOUNTER
Per fax from [employer] a Prior Authorization is needed for Arnuity Ellipta, please advise if you would like a PA started or change the medication, thanks  Melva DURBIN (R)    CoverMyMeds  Key:[id number]  Last name: Ruben  :1975

## 2018-03-06 NOTE — NURSING NOTE
Prior to injection verified patient identity using patient's name and date of birth.    Screening Questionnaire for Adult Immunization    Are you sick today?   No   Do you have allergies to medications, food, a vaccine component or latex?   No   Have you ever had a serious reaction after receiving a vaccination?   No   Do you have a long-term health problem with heart disease, lung disease, asthma, kidney disease, metabolic disease (e.g. diabetes), anemia, or other blood disorder?   Asthma   Do you have cancer, leukemia, HIV/AIDS, or any other immune system problem?   History of Cancer not currently being treated.   In the past 3 months, have you taken medications that affect  your immune system, such as prednisone, other steroids, or anticancer drugs; drugs for the treatment of rheumatoid arthritis, Crohn s disease, or psoriasis; or have you had radiation treatments?   No   Have you had a seizure, or a brain or other nervous system problem?   No   During the past year, have you received a transfusion of blood or blood     products, or been given immune (gamma) globulin or antiviral drug?   No   For women: Are you pregnant or is there a chance you could become        pregnant during the next month?   No   Have you received any vaccinations in the past 4 weeks?   No     Immunization questionnaire was positive for at least one answer.  Notified Spencer Longo.        Per orders of Spencer Longo, injection of Tdap given by Lorena Queen. Patient instructed to remain in clinic for 15 minutes afterwards, and to report any adverse reaction to me immediately.       Screening performed by Lorena Queen on 3/6/2018 at 5:24 PM.

## 2018-03-06 NOTE — NURSING NOTE
"Chief Complaint   Patient presents with     Recheck Medication     Panel Management     Tdap, flu, DAP, PHQ9, ACT       Initial /80 (Cuff Size: Adult Regular)  Pulse 100  Temp 98.8  F (37.1  C) (Temporal)  Resp 18  Ht 5' 3.89\" (1.623 m)  Wt 128 lb 14.4 oz (58.5 kg)  LMP 01/04/2008  BMI 22.2 kg/m2 Estimated body mass index is 22.2 kg/(m^2) as calculated from the following:    Height as of this encounter: 5' 3.89\" (1.623 m).    Weight as of this encounter: 128 lb 14.4 oz (58.5 kg).  Medication Reconciliation: complete  "

## 2018-03-07 ASSESSMENT — ASTHMA QUESTIONNAIRES: ACT_TOTALSCORE: 12

## 2018-03-07 ASSESSMENT — PATIENT HEALTH QUESTIONNAIRE - PHQ9: SUM OF ALL RESPONSES TO PHQ QUESTIONS 1-9: 10

## 2018-03-08 RX ORDER — FLUTICASONE PROPIONATE 220 UG/1
2 AEROSOL, METERED RESPIRATORY (INHALATION) 2 TIMES DAILY
Qty: 3 INHALER | Refills: 1 | Status: SHIPPED | OUTPATIENT
Start: 2018-03-08

## 2018-03-08 NOTE — TELEPHONE ENCOUNTER
Medication was changed to QVAR, which is also requiring a Prior Authorization, I called [phone]/ [phone] to see what would be covered without a Prior Authorization, I was on hold for 20+ minutes trying to find out what would be covered but had to hang up before I was able to speak to a representative, would you like me to try calling back again or would you like to change the medication or start a Prior Authorization, please advise   Thanks  Melva DURBIN (R)    Key: [id number]  CoverMyMeds

## 2018-03-21 ENCOUNTER — TELEPHONE (OUTPATIENT)
Dept: FAMILY MEDICINE | Facility: OTHER | Age: 43
End: 2018-03-21

## 2018-03-21 NOTE — TELEPHONE ENCOUNTER
Summary:    Patient is due/failing the following:   ACT    Action needed:   Patient needs to do ACT.    Type of outreach:    Sent letter.    Questions for provider review:    None                                                                                                                                    Mariama Nickolascolin       Chart routed to Care Team .          Panel Management Review      Patient has the following on her problem list:     Depression / Dysthymia review    Measure:  Needs PHQ-9 score of 4 or less during index window.  Administer PHQ-9 and if score is 5 or more, send encounter to provider for next steps.        PHQ-9 SCORE 11/10/2016 11/13/2017 3/6/2018   Total Score - - -   Total Score MyChart - 11 (Moderate depression) 10 (Moderate depression)   Total Score 18 11 10       If PHQ-9 recheck is 5 or more, route to provider for next steps.    Patient is due for:  None    Asthma review     ACT Total Scores 3/6/2018   ACT TOTAL SCORE -   ASTHMA ER VISITS -   ASTHMA HOSPITALIZATIONS -   ACT TOTAL SCORE (Goal Greater than or Equal to 20) 12   In the past 12 months, how many times did you visit the emergency room for your asthma without being admitted to the hospital? 0   In the past 12 months, how many times were you hospitalized overnight because of your asthma? 0      1. Is Asthma diagnosis on the Problem List? Yes    2. Is Asthma listed on Health Maintenance? Yes    3. Patient is due for:  ACT      Composite cancer screening  Chart review shows that this patient is due/due soon for the following None

## 2018-03-21 NOTE — LETTER
[facility]  [address] [place] [id number]  Phone: [phone]  March 21, 2018      Anais Miranda  [address]   [facility] [address]      Dear Anais,    We care about your health and have reviewed your health plan including your medical conditions, medications, and lab results.  Based on this review, it is recommended that you follow up regarding the following health topic(s):  -Asthma    We recommend you take the following action(s):   -Complete and return the attached ASTHMA CONTROL TEST.  If your total score is 19 or less or you have been to the ER or urgent care for your asthma, then please schedule an asthma followup appointment.     Please call us at the [facility] - [phone] (or use [employer]) to address the above recommendations.     Thank you for trusting [facility] and we appreciate the opportunity to serve you.  We look forward to supporting your healthcare needs in the future.    Healthy Regards,    Your Health Care Team  [facility]

## 2018-04-12 ENCOUNTER — OFFICE VISIT (OUTPATIENT)
Dept: OBGYN | Facility: OTHER | Age: 43
End: 2018-04-12
Payer: MEDICAID

## 2018-04-12 VITALS
HEART RATE: 86 BPM | WEIGHT: 131.5 LBS | DIASTOLIC BLOOD PRESSURE: 80 MMHG | SYSTOLIC BLOOD PRESSURE: 120 MMHG | BODY MASS INDEX: 22.65 KG/M2

## 2018-04-12 DIAGNOSIS — R87.811 VAGINAL HIGH RISK HPV DNA TEST POSITIVE: Primary | ICD-10-CM

## 2018-04-12 PROCEDURE — 57420 EXAM OF VAGINA W/SCOPE: CPT | Performed by: OBSTETRICS & GYNECOLOGY

## 2018-04-12 NOTE — MR AVS SNAPSHOT
After Visit Summary   4/12/2018    Anais Miranda    MRN: [id number]           Patient Information     Date Of Birth          1975        Visit Information        Provider Department      4/12/2018 1:30 PM Lisandra Schwarz MD; NL COLP [facility]        Today's Diagnoses     Vaginal high risk HPV DNA test positive    -  1       Follow-ups after your visit        Follow-up notes from your care team     Return in about 7 months (around 11/12/2018) for Physical Exam.      Who to contact     If you have questions or need follow up information about today's clinic visit or your schedule please contact [facility] directly at [phone].  Normal or non-critical lab and imaging results will be communicated to you by MyChart, letter or phone within 4 business days after the clinic has received the results. If you do not hear from us within 7 days, please contact the clinic through [employer] or phone. If you have a critical or abnormal lab result, we will notify you by phone as soon as possible.  Submit refill requests through [employer] or call your pharmacy and they will forward the refill request to us. Please allow 3 business days for your refill to be completed.          Additional Information About Your Visit        MyChart Information     [employer] gives you secure access to your electronic health record. If you see a primary care provider, you can also send messages to your care team and make appointments. If you have questions, please call your primary care clinic.  If you do not have a primary care provider, please call [phone] and they will assist you.        Care EveryWhere ID     This is your Care EveryWhere ID. This could be used by other organizations to access your [facility] medical records  [id number]        Your Vitals Were     Pulse Last Period BMI (Body Mass Index)             86 01/04/2008 22.65 kg/m2          Blood Pressure from Last 3  Encounters:   04/12/18 120/80   03/06/18 120/80   11/13/17 112/78    Weight from Last 3 Encounters:   04/12/18 131 lb 8 oz (59.6 kg)   03/06/18 128 lb 14.4 oz (58.5 kg)   11/13/17 131 lb (59.4 kg)              We Performed the Following     COLP CERVIX/UPPER VAGINA W BX CERVIX/ENDOCERV CURETT        Primary Care Provider Office Phone # Fax #    Spencer Peter PA-C [phone]       [place] [address]        Equal Access to Services     [facility] VERONICA : Hadii aad ku hadasho Soomaali, waaxda luqadaha, qaybta kaalmada adeegyada, waxchristina dennison hayaan adejosé alexandre . So [facility] [phone].    ATENCIÓN: Si habla español, tiene a swan disposición servicios gratuitos de asistencia lingüística. [facility] [phone].    We comply with applicable federal civil rights laws and [place] laws. We do not discriminate on the basis of race, color, national origin, age, disability, sex, sexual orientation, or gender identity.            Thank you!     Thank you for choosing [facility]  for your care. Our goal is always to provide you with excellent care. Hearing back from our patients is one way we can continue to improve our services. Please take a few minutes to complete the written survey that you may receive in the mail after your visit with us. Thank you!             Your Updated Medication List - Protect others around you: Learn how to safely use, store and throw away your medicines at www.disposemymeds.org.          This list is accurate as of 4/12/18  1:57 PM.  Always use your most recent med list.                   Brand Name Dispense Instructions for use Diagnosis    albuterol 108 (90 Base) MCG/ACT Inhaler    PROAIR HFA/PROVENTIL HFA/VENTOLIN HFA    3 Inhaler    Inhale 2 puffs into the lungs every 6 hours as needed for shortness of breath / dyspnea    Mild persistent asthma without complication       beclomethasone 80 MCG/ACT Inhaler    QVAR    3 Inhaler     Inhale 2 puffs into the lungs 2 times daily    Mild persistent asthma without complication       fluticasone 220 MCG/ACT Inhaler    FLOVENT HFA    3 Inhaler    Inhale 2 puffs into the lungs 2 times daily    Mild persistent asthma without complication       methylPREDNISolone 4 MG tablet    MEDROL DOSEPAK    21 tablet    Follow package instructions    Mild persistent asthma without complication, Hives       * QUEtiapine 50 MG tablet    SEROQUEL    60 tablet    Take 1 tablet (50 mg) by mouth 2 times daily    Major depressive disorder, recurrent episode, moderate (H), Social phobia, Bipolar disorder, in partial remission, most recent episode manic (H), Anxiety, Tobacco abuse, ETOH abuse       * QUEtiapine 100 MG tablet    SEROquel    100 tablet    Take 2 tablets (200 mg) by mouth 2 times daily For one week then 150 PO BID.    Social phobia, Bipolar disorder, in partial remission, most recent episode manic (H), Tobacco abuse, ETOH abuse, Anxiety       * Notice:  This list has 2 medication(s) that are the same as other medications prescribed for you. Read the directions carefully, and ask your doctor or other care provider to review them with you.

## 2018-04-12 NOTE — NURSING NOTE
"Chief Complaint   Patient presents with     Colposcopy       Initial /80 (BP Location: Left arm, Patient Position: Chair, Cuff Size: Adult Regular)  Pulse 86  Wt 131 lb 8 oz (59.6 kg)  LMP 2008  BMI 22.65 kg/m2 Estimated body mass index is 22.65 kg/(m^2) as calculated from the following:    Height as of 3/6/18: 5' 3.89\" (1.623 m).    Weight as of this encounter: 131 lb 8 oz (59.6 kg).  BP completed using cuff size: regular        The following HM Due: NONE      The following patient reported/Care Every where data was sent to:  P ABSTRACT QUALITY INITIATIVES [01472]       N/a    Diane Ford CMA  2018           "

## 2018-07-16 ENCOUNTER — TELEPHONE (OUTPATIENT)
Dept: FAMILY MEDICINE | Facility: OTHER | Age: 43
End: 2018-07-16

## 2018-07-16 NOTE — LETTER
[facility]  [address] [place] [id number]  Phone: [phone]  July 16, 2018      Anais Miranda  [address]   [facility] [address]      Dear Anais,    We care about your health and have reviewed your health plan including your medical conditions, medications, and lab results.  Based on this review, it is recommended that you follow up regarding the following health topic(s):  -Asthma    We recommend you take the following action(s):   -Complete and return the attached ASTHMA CONTROL TEST.  If your total score is 19 or less or you have been to the ER or urgent care for your asthma, then please schedule an asthma followup appointment.     Please call us at the [facility] - [phone] (or use [employer]) to address the above recommendations.     Thank you for trusting [facility] and we appreciate the opportunity to serve you.  We look forward to supporting your healthcare needs in the future.    Healthy Regards,    Your Health Care Team  [facility]

## 2018-08-21 ASSESSMENT — ASTHMA QUESTIONNAIRES: ACT_TOTALSCORE: 16

## 2018-10-01 ENCOUNTER — TELEPHONE (OUTPATIENT)
Dept: FAMILY MEDICINE | Facility: OTHER | Age: 43
End: 2018-10-01

## 2018-10-01 NOTE — TELEPHONE ENCOUNTER
Summary:    Patient is due/failing the following:   Asthma and depression follow up, ACT and PHQ9    Action needed:   Patient needs office visit for follow up., Patient needs to do ACT. and Patient needs to do PHQ9.    Type of outreach:    Phone, left message for patient to call back.     Questions for provider review:    None                                                                                                                                    Mariama Diaz       Chart routed to Care Team .        Panel Management Review      Patient has the following on her problem list:     Depression / Dysthymia review    Measure:  Needs PHQ-9 score of 4 or less during index window.  Administer PHQ-9 and if score is 5 or more, send encounter to provider for next steps.        PHQ-9 SCORE 11/10/2016 11/13/2017 3/6/2018   Total Score - - -   Total Score MyChart - 11 (Moderate depression) 10 (Moderate depression)   Total Score 18 11 10       If PHQ-9 recheck is 5 or more, route to provider for next steps.    Patient is due for:  PHQ9    Asthma review     ACT Total Scores 8/20/2018   ACT TOTAL SCORE -   ASTHMA ER VISITS -   ASTHMA HOSPITALIZATIONS -   ACT TOTAL SCORE (Goal Greater than or Equal to 20) 16   In the past 12 months, how many times did you visit the emergency room for your asthma without being admitted to the hospital? 3   In the past 12 months, how many times were you hospitalized overnight because of your asthma? 1      1. Is Asthma diagnosis on the Problem List? Yes    2. Is Asthma listed on Health Maintenance? Yes    3. Patient is due for:  ACT      Composite cancer screening  Chart review shows that this patient is due/due soon for the following None

## 2018-10-01 NOTE — LETTER
[facility]  [address] [place] [address]  Phone: [phone]  October 31, 2018      Anais Miranda  [address]   [facility] [address]      Dear Anais,    We care about your health and have reviewed your health plan including your medical conditions, medications, and lab results.  Based on this review, it is recommended that you follow up regarding the following health topic(s):  -Asthma  -Depression    We recommend you take the following action(s):  -schedule a FOLLOWUP APPOINTMENT.   -Complete and return the attached ASTHMA CONTROL TEST.  If your total score is 19 or less or you have been to the ER or urgent care for your asthma, then please schedule an asthma followup appointment.  -Complete and return the attached PHQ-9 Form.  If your total score is greater than 9, please schedule a followup appointment.  If you answer Yes to question 9, call your clinic between the hours of 8 to 5.  You may also call the Suicide Hotline at 4-498-073-XNMK ([phone]) any time.     Please call us at the [facility] - [phone] (or use [employer]) to address the above recommendations.     Thank you for trusting [facility] and we appreciate the opportunity to serve you.  We look forward to supporting your healthcare needs in the future.    Healthy Regards,    Your Health Care Team  [facility]

## 2018-10-31 NOTE — TELEPHONE ENCOUNTER
Pt returned call,relayed below. Pt will have to call back to schedule once she looks at her schedule.

## 2018-11-21 ENCOUNTER — TELEPHONE (OUTPATIENT)
Dept: OBGYN | Facility: OTHER | Age: 43
End: 2018-11-21

## 2018-11-21 NOTE — TELEPHONE ENCOUNTER
Pt is past due for Pap follow up  Reminder letter has been sent  LMTC her clinic with any questions or to schedule    Tete Mathias,   Pap Tracking

## 2018-12-20 ENCOUNTER — TELEPHONE (OUTPATIENT)
Dept: FAMILY MEDICINE | Facility: OTHER | Age: 43
End: 2018-12-20

## 2018-12-20 NOTE — TELEPHONE ENCOUNTER
Reprinted the form that was done on the date that is [place].  If they need another form completed she will need to present to the clinic for further evaluation and discussion around her opinion related to her medical condition.  Electronically signed:    Spencer Longo PA-C

## 2018-12-20 NOTE — TELEPHONE ENCOUNTER
Reason for Call:  Form, our goal is to have forms completed with 72 hours, however, some forms may require a visit or additional information.    Type of letter, form or note:  medical    Who is the form from?: [place] Department of Human Services (if other please explain)    Where did the form come from: form was faxed in    What clinic location was the form placed at?: [facility] [[email or web address]] - [phone]    Where the form was placed: 's Box    What number is listed as a contact on the form?: [phone]       Additional comments: n/a    Call taken on 12/20/2018 at 2:07 PM by Sharonda Morris

## 2019-03-11 ENCOUNTER — TELEPHONE (OUTPATIENT)
Dept: FAMILY MEDICINE | Facility: OTHER | Age: 44
End: 2019-03-11

## 2019-03-11 NOTE — TELEPHONE ENCOUNTER
Summary:    Patient is due/failing the following:   ACT, PAP and PHQ9    Action needed:   Patient needs office visit for PAP and follow up., Patient needs to do ACT. and Patient needs to do PHQ9.    Type of outreach:    Sent letter.    Questions for provider review:    None                                                                                                                                    Mariama Diaz       Chart routed to Care Team .        Panel Management Review      Patient has the following on her problem list:     Depression / Dysthymia review    Measure:  Needs PHQ-9 score of 4 or less during index window.  Administer PHQ-9 and if score is 5 or more, send encounter to provider for next steps.        PHQ-9 SCORE 11/10/2016 11/13/2017 3/6/2018   PHQ-9 Total Score - - -   PHQ-9 Total Score MyChart - 11 (Moderate depression) 10 (Moderate depression)   PHQ-9 Total Score 18 11 10       If PHQ-9 recheck is 5 or more, route to provider for next steps.    Patient is due for:  PHQ9    Asthma review     ACT Total Scores 8/20/2018   ACT TOTAL SCORE -   ASTHMA ER VISITS -   ASTHMA HOSPITALIZATIONS -   ACT TOTAL SCORE (Goal Greater than or Equal to 20) 16   In the past 12 months, how many times did you visit the emergency room for your asthma without being admitted to the hospital? 3   In the past 12 months, how many times were you hospitalized overnight because of your asthma? 1      1. Is Asthma diagnosis on the Problem List? Yes    2. Is Asthma listed on Health Maintenance? Yes    3. Patient is due for:  ACT      Composite cancer screening  Chart review shows that this patient is due/due soon for the following Pap Smear

## 2019-03-11 NOTE — LETTER
[facility]  [address] [facility] [address]  Phone: [phone]  March 11, 2019      Anais Miranda  [address]   [facility] [address]      Dear Anais,    We care about your health and have reviewed your health plan including your medical conditions, medications, and lab results.  Based on this review, it is recommended that you follow up regarding the following health topic(s):  -Asthma  -Depression  -Cervical Cancer Screening    We recommend you take the following action(s):  -schedule a PAP SMEAR EXAM which is due.  Please disregard this reminder if you have had this exam elsewhere within the last year.  It would be helpful for us to have a copy of your recent pap smear report to update your records.   -Complete and return the attached ASTHMA CONTROL TEST.  If your total score is 19 or less or you have been to the ER or urgent care for your asthma, then please schedule an asthma followup appointment.  -Complete and return the attached PHQ-9 Form.  If your total score is greater than 9, please schedule a followup appointment.  If you answer Yes to question 9, call your clinic between the hours of 8 to 5.  You may also call the Suicide Hotline at 9-947-943-HGDN (9571) any time.     Please call us at the [facility] - [phone] (or use [employer]) to address the above recommendations.     Thank you for trusting [facility] and we appreciate the opportunity to serve you.  We look forward to supporting your healthcare needs in the future.    Healthy Regards,    Your Health Care Team  [facility]

## 2019-05-16 ENCOUNTER — HEALTH MAINTENANCE LETTER (OUTPATIENT)
Age: 44
End: 2019-05-16

## 2019-11-07 ENCOUNTER — HEALTH MAINTENANCE LETTER (OUTPATIENT)
Age: 44
End: 2019-11-07

## 2020-11-29 ENCOUNTER — HEALTH MAINTENANCE LETTER (OUTPATIENT)
Age: 45
End: 2020-11-29

## 2021-09-25 ENCOUNTER — HEALTH MAINTENANCE LETTER (OUTPATIENT)
Age: 46
End: 2021-09-25

## 2022-01-09 ENCOUNTER — HEALTH MAINTENANCE LETTER (OUTPATIENT)
Age: 47
End: 2022-01-09

## 2022-10-03 NOTE — PROGRESS NOTES
42 year old  presents for colposcopy.      Indication for procedure NIL, HPV 16    12/3/07 pap ASCUS + HR HPV  08 colp- WNL  08 LSIL/+ HR HPV  08 colp- OLIVA 1 and OLIVA 3  09--Total vaginal hysterectomy. Uterus:Cervix with no diagnostic abnormalities.   12/1/10 pap NIL  1/13/15 vaginal pap NIL/+ HR HPV 16 and other HR HPV. Plan: cotest in 1 yr.  17 NIL pap, + HR HPV # 16 and other (no 18) vaginal. Plan: colp     Tobacco:Yes , the patient was counseled to stop smoking.    Discussed nature of HPV related infection, natural history and association with cervical dysplasia.  Procedure for colposcopy and biopsy was explained to the patient and consent obtained.  All the patient's questions were answered.    PROCEDURE:  COLPOSCOPY  After a procedural timeout was taken, she was positioned in dorsal lithotomy and a speculum was inserted to allow visualization of the cervix. A 5% acetic acid solution was applied to the ectocervix with large swabs. Lugols solution was also applied.  Colposcopic examination was then undertaken of the cervix, distal vaginal canal and vaginal fornices.    FINDINGS:    Cervix: absent    Vaginal inspection: normal without visible lesions.    Vulvar colposcopy: vulvar colposcopy not performed.     Procedure Summary: Patient tolerated procedure well     Colposcopic Impression: normal    20 minutes were spent in face to face discussion regarding her abnormal pap, separate from the procedure.     Plan: pap and HPV in 2018      Lisandra Schwarz MD         OK for refill.   ES

## 2022-12-26 ENCOUNTER — HEALTH MAINTENANCE LETTER (OUTPATIENT)
Age: 47
End: 2022-12-26

## 2023-04-16 ENCOUNTER — HEALTH MAINTENANCE LETTER (OUTPATIENT)
Age: 48
End: 2023-04-16